# Patient Record
Sex: MALE | URBAN - NONMETROPOLITAN AREA
[De-identification: names, ages, dates, MRNs, and addresses within clinical notes are randomized per-mention and may not be internally consistent; named-entity substitution may affect disease eponyms.]

---

## 2018-01-02 ENCOUNTER — OFFICE VISIT CONVERTED (OUTPATIENT)
Dept: FAMILY MEDICINE CLINIC | Age: 48
End: 2018-01-02
Attending: NURSE PRACTITIONER

## 2021-05-18 NOTE — PROGRESS NOTES
Kenneth Bolanos 1970     Office/Outpatient Visit    Visit Date: Tue, Jan 2, 2018 05:55 pm    Provider: April Ware N.P. (Assistant: Zenaida Odom MA)    Location: [facility]        Electronically signed by April Ware N.P. on  01/02/2018 09:31:28 PM                             SUBJECTIVE:        CC:     Mr. Bolanos is a 47 year old White male.  Patient is here for suture removal on right middle finger.          HPI:         dec 25 was putting wood into wood stove.  wood fell onto right 3rd finger and caused laceration.  went to [facility] ER .  4 sutures placed.  rec'd tetanus shot and completed course of keflex.  denies problems.      ROS:     CONSTITUTIONAL:  Negative for chills, fatigue, fever, and weight change.      CARDIOVASCULAR:  Negative for chest pain, palpitations, tachycardia, orthopnea, and edema.      RESPIRATORY:  Negative for cough, dyspnea, and hemoptysis.      MUSCULOSKELETAL:  Negative for arthralgias, back pain, and myalgias.      INTEGUMENTARY:  Positive for sutures right third finger.      NEUROLOGICAL:  Negative for dizziness, headaches, paresthesias, and weakness.      ENDOCRINE:  Negative for hair loss, heat/cold intolerance, polydipsia, and polyphagia.          PMH/FMH/SH:     Last Reviewed on 1/02/2018 09:27 PM by April Ware    Past Medical History:             PAST MEDICAL HISTORY     UNREMARKABLE         Tobacco/Alcohol/Supplements:     Last Reviewed on 1/02/2018 06:06 PM by Zenaida Odom    Tobacco: He has a past history of cigarette smoking; quit date:  2008.   He currently uses smokeless tobacco, ____1_ cans/pouches per day cans/pouches daily..              Current Problems:       None Recorded         Immunizations:     None        Allergies:     Last Reviewed on 1/02/2018 06:03 PM by Zenaida Odom      No Known Drug Allergies.         Current Medications:     Cephalexin 500mg Capsules 1 Capsules 4 x Daily until finished          OBJECTIVE:        Vitals:         Current: 1/2/2018 6:01:47 PM    Ht:  5 ft, 11.5 in;  Wt: 213.8 lbs;  BMI: 29.4    T: 97 F;  BP: 136/80 mm Hg;  P: 74 bpm        Exams:     PHYSICAL EXAM:     GENERAL:  well developed and nourished; appropriately groomed; in no apparent distress;     RESPIRATORY: normal respiratory rate and pattern with no distress; normal breath sounds with no rales, rhonchi, wheezes or rubs;     CARDIOVASCULAR: normal rate; rhythm is regular;     SKIN: 2 and 1/2 inch healed laceration with scabbing.  edges approximated.;     MUSCULOSKELETAL:  Normal range of motion, strength and tone;     NEUROLOGIC: mental status: alert and oriented x 3; GROSSLY INTACT     PSYCHIATRIC:  appropriate affect and demeanor; normal speech pattern; grossly normal memory;         Procedures:     Encounter for removal of sutures     Sutures were removed today without difficulty.  The area is healed without signs of infection. 4 sutures             ASSESSMENT           V58.32   Z48.02  Encounter for removal of sutures              DDx:         PLAN:          Encounter for removal of sutures         RECOMMENDATIONS given include: ER record requested for review.  call if signs of infection.   states he has no significant medical hx and rarely goes for medical care.  advise physical..              CHARGE CAPTURE           **Please note: ICD descriptions below are intended for billing purposes only and may not represent clinical diagnoses**        Primary Diagnosis:         V58.32 Encounter for removal of sutures            Z48.02    Encounter for removal of sutures              Orders:          15212   Office visit - new pt, level 2  (In-House)

## 2021-07-01 VITALS
DIASTOLIC BLOOD PRESSURE: 80 MMHG | WEIGHT: 213.8 LBS | HEART RATE: 74 BPM | TEMPERATURE: 97 F | SYSTOLIC BLOOD PRESSURE: 136 MMHG | BODY MASS INDEX: 28.96 KG/M2 | HEIGHT: 72 IN